# Patient Record
Sex: FEMALE | Race: NATIVE HAWAIIAN OR OTHER PACIFIC ISLANDER | NOT HISPANIC OR LATINO | Employment: FULL TIME | URBAN - METROPOLITAN AREA
[De-identification: names, ages, dates, MRNs, and addresses within clinical notes are randomized per-mention and may not be internally consistent; named-entity substitution may affect disease eponyms.]

---

## 2023-04-04 ENCOUNTER — OFFICE VISIT (OUTPATIENT)
Dept: URGENT CARE | Facility: CLINIC | Age: 53
End: 2023-04-04

## 2023-04-04 VITALS
HEART RATE: 106 BPM | SYSTOLIC BLOOD PRESSURE: 106 MMHG | OXYGEN SATURATION: 96 % | RESPIRATION RATE: 14 BRPM | DIASTOLIC BLOOD PRESSURE: 74 MMHG | WEIGHT: 163.25 LBS | TEMPERATURE: 97.8 F

## 2023-04-04 DIAGNOSIS — J02.9 SORE THROAT: Primary | ICD-10-CM

## 2023-04-04 DIAGNOSIS — J06.9 ACUTE URI: ICD-10-CM

## 2023-04-04 LAB — S PYO AG THROAT QL: NEGATIVE

## 2023-04-04 RX ORDER — NICOTINE 21 MG/24HR
1 PATCH, TRANSDERMAL 24 HOURS TRANSDERMAL DAILY
COMMUNITY
Start: 2023-03-27

## 2023-04-04 RX ORDER — ATORVASTATIN CALCIUM 40 MG/1
TABLET, FILM COATED ORAL
COMMUNITY
Start: 2023-03-17

## 2023-04-04 RX ORDER — ROPINIROLE 0.25 MG/1
TABLET, FILM COATED ORAL
COMMUNITY
Start: 2023-03-17

## 2023-04-04 NOTE — PATIENT INSTRUCTIONS
--Rest, drink plenty of fluids      --For nasal/sinus congestion, you can try steam, warm compresses, saline nasal spray, Neti pot, nasal steroid (Flonase, Nasocort) to be used at bedtime after the saline nasal spray or nasal decongestant (Afrin, Philip-synephrine - for 3 days max or you can get rebound symptoms) may be taken  Can also decongestant (such as Sudafed) if > 10years of age and no history of high blood pressure  --For cough, you can take an OTC expectorant such as plain Robitussion or Mucinex  A spoonful of honey at bedtime may also be helpful  --For sore throat, you can take OTC lozenges, use warm gargles (salt water or apple cider vinegar and honey), herbal teas, or an OTC throat spray (Chloraseptic)  --You can take Tylenol or Motrin/Advil as needed for fever, headache, body aches  Do not take Motrin/Advil if you have a history of heart disease, bleeding ulcers, or if you take blood thinners       -For cold symptoms with high blood pressure take Coricidin cough/cold  --You should contact your primary care provider and/or go to the ER if your symptoms are not improved or get worse over the next 7 days  This includes new onset fever, localized ear pain, sinus pain, as well as worsening cough, chest pain, shortness of breath, or significant weakness/fatigue

## 2023-04-04 NOTE — LETTER
April 4, 2023     Patient: Evelio Adan   YOB: 1970   Date of Visit: 4/4/2023       To Whom it May Concern:    Evelio Adan was seen in my clinic on 4/4/2023  She should be excused 4/3/23  If you have any questions or concerns, please don't hesitate to call           Sincerely,          JUDE Wray        CC: No Recipients

## 2023-04-04 NOTE — PROGRESS NOTES
[facility]        NAME: Kayla Carey is a 47 y o  female  : 1970    MRN: [id number]  DATE: 2023  TIME: 3:13 PM    Assessment and Plan   Sore throat [J02 9]  1  Sore throat  POCT rapid strepA    Throat culture      2  Acute URI          Symptoms consistent with viral illness  POC strep is negative  Given education on supportive measures for symptoms in discharge instructions  Follow up with primary care in 3-5 days  Go to ER if symptoms get worse  Patient Instructions     --Rest, drink plenty of fluids     --For nasal/sinus congestion, you can try steam, warm compresses, saline nasal spray, Neti pot, nasal steroid (Flonase, Nasocort) to be used at bedtime after the saline nasal spray or nasal decongestant (Afrin, Philip-synephrine - for 3 days max or you can get rebound symptoms) may be taken  Can also decongestant (such as Sudafed) if > 10years of age and no history of high blood pressure  --For cough, you can take an OTC expectorant such as plain Robitussion or Mucinex  A spoonful of honey at bedtime may also be helpful  --For sore throat, you can take OTC lozenges, use warm gargles (salt water or apple cider vinegar and honey), herbal teas, or an OTC throat spray (Chloraseptic)  --You can take Tylenol or Motrin/Advil as needed for fever, headache, body aches  Do not take Motrin/Advil if you have a history of heart disease, bleeding ulcers, or if you take blood thinners      -For cold symptoms with high blood pressure take Coricidin cough/cold  --You should contact your primary care provider and/or go to the ER if your symptoms are not improved or get worse over the next 7 days  This includes new onset fever, localized ear pain, sinus pain, as well as worsening cough, chest pain, shortness of breath, or significant weakness/fatigue  Chief Complaint     Chief Complaint   Patient presents with   • Neck Pain     Started   Strained neck      • Cough     Started this morning, dry cough, feeling lethargic  Sore throat  No fever  Headache  History of Present Illness       Presents with sick symptoms including neck pain that started on Sunday and now with cough, fatigued, sore throat that began this morning  Review of Systems   Review of Systems   Constitutional: Positive for fatigue  Negative for chills and fever  HENT: Positive for sore throat  Negative for congestion  Respiratory: Positive for cough  Negative for shortness of breath and wheezing  Cardiovascular: Negative for chest pain  Gastrointestinal: Negative for abdominal pain  Genitourinary: Negative for dysuria  Musculoskeletal: Positive for neck pain  Negative for myalgias  Neurological: Negative for dizziness  Psychiatric/Behavioral: Negative for confusion  Current Medications       Current Outpatient Medications:   •  atorvastatin (LIPITOR) 40 mg tablet, TAKE 1 TABLET BY MOUTH EVERY DAY AT NIGHT, Disp: , Rfl:   •  nicotine (NICODERM CQ) 21 mg/24 hr TD 24 hr patch, Place 1 patch on the skin daily, Disp: , Rfl:   •  rOPINIRole (REQUIP) 0 25 mg tablet, PLEASE SEE ATTACHED FOR DETAILED DIRECTIONS, Disp: , Rfl:     Current Allergies     Allergies as of 04/04/2023 - Reviewed 04/04/2023   Allergen Reaction Noted   • Codeine GI Intolerance 04/04/2023            The following portions of the patient's history were reviewed and updated as appropriate: allergies, current medications, past family history, past medical history, past social history, past surgical history and problem list      Past Medical History:   Diagnosis Date   • Restless leg        History reviewed  No pertinent surgical history  History reviewed  No pertinent family history  Medications have been verified  Objective   /74   Pulse (!) 106   Temp 97 8 °F (36 6 °C)   Resp 14   Wt 74 kg (163 lb 4 oz)   SpO2 96%        Physical Exam     Physical Exam  Vitals reviewed     Constitutional: General: She is not in acute distress  Appearance: Normal appearance  HENT:      Ears:      Comments: B/l TM view obstructed by dark brown cerumen     Nose: Nose normal       Mouth/Throat:      Mouth: Mucous membranes are moist       Pharynx: No posterior oropharyngeal erythema  Eyes:      Conjunctiva/sclera: Conjunctivae normal    Cardiovascular:      Rate and Rhythm: Normal rate and regular rhythm  Pulses: Normal pulses  Heart sounds: Normal heart sounds  No murmur heard  Pulmonary:      Effort: Pulmonary effort is normal  No respiratory distress  Breath sounds: Normal breath sounds  Musculoskeletal:      Cervical back: Full passive range of motion without pain  Normal range of motion  Skin:     General: Skin is warm and dry  Neurological:      General: No focal deficit present  Mental Status: She is alert and oriented to person, place, and time     Psychiatric:         Mood and Affect: Mood normal          Behavior: Behavior normal

## 2023-04-06 LAB — BACTERIA THROAT CULT: NORMAL

## 2023-05-23 PROBLEM — G43.719 INTRACTABLE CHRONIC MIGRAINE WITHOUT AURA AND WITHOUT STATUS MIGRAINOSUS: Status: ACTIVE | Noted: 2021-09-10

## 2023-05-23 PROBLEM — I70.8: Status: ACTIVE | Noted: 2021-09-10

## 2023-05-23 PROBLEM — I73.9 INTERMITTENT CLAUDICATION (HCC): Status: ACTIVE | Noted: 2021-09-10

## 2023-05-23 PROBLEM — Z72.0 TOBACCO ABUSE: Status: ACTIVE | Noted: 2022-08-20

## 2023-05-23 PROBLEM — F32.A ANXIETY AND DEPRESSION: Status: ACTIVE | Noted: 2023-04-20

## 2023-05-23 PROBLEM — I70.203 ATHEROSCLEROSIS OF ARTERY OF BOTH LOWER EXTREMITIES (HCC): Status: ACTIVE | Noted: 2021-09-10

## 2023-05-23 PROBLEM — F41.9 ANXIETY AND DEPRESSION: Status: ACTIVE | Noted: 2023-04-20

## 2023-05-23 PROBLEM — I74.5 ILIAC ARTERY OCCLUSION (HCC): Status: ACTIVE | Noted: 2022-08-20

## 2023-05-23 PROBLEM — G25.81 RESTLESS LEG SYNDROME: Status: ACTIVE | Noted: 2023-04-20

## 2024-08-27 ENCOUNTER — OFFICE VISIT (OUTPATIENT)
Dept: PHYSICAL THERAPY | Age: 54
End: 2024-08-27
Payer: COMMERCIAL

## 2024-08-27 DIAGNOSIS — M54.41 CHRONIC BILATERAL LOW BACK PAIN WITH BILATERAL SCIATICA: Primary | ICD-10-CM

## 2024-08-27 DIAGNOSIS — G89.29 CHRONIC BILATERAL LOW BACK PAIN WITH BILATERAL SCIATICA: Primary | ICD-10-CM

## 2024-08-27 DIAGNOSIS — M54.42 CHRONIC BILATERAL LOW BACK PAIN WITH BILATERAL SCIATICA: Primary | ICD-10-CM

## 2024-08-27 PROCEDURE — 97140 MANUAL THERAPY 1/> REGIONS: CPT | Performed by: PHYSICAL THERAPIST

## 2024-08-27 PROCEDURE — 97110 THERAPEUTIC EXERCISES: CPT | Performed by: PHYSICAL THERAPIST

## 2024-08-27 PROCEDURE — 97162 PT EVAL MOD COMPLEX 30 MIN: CPT | Performed by: PHYSICAL THERAPIST

## 2024-08-27 NOTE — PROGRESS NOTES
PT Evaluation     Today's date: 2024  Patient name: Simin Vail  : 1970  MRN: [id number]  Referring provider: Vinicio Ahn PT  Dx:   Encounter Diagnosis     ICD-10-CM    1. Chronic bilateral low back pain with bilateral sciatica  M54.42     M54.41     G89.29           Start Time: 1600  Stop Time: 1700  Total time in clinic (min): 60 minutes    Assessment  Impairments: abnormal gait, abnormal muscle tone, abnormal or restricted ROM, abnormal movement, activity intolerance, impaired physical strength, lacks appropriate home exercise program, pain with function, weight-bearing intolerance, poor posture  and poor body mechanics    Assessment details: Simin Vail is a 54 y.o. female who presents with pain, decreased strength, decreased ROM, decreased joint mobility, and postural dysfunction. Due to these impairments, Patient has difficulty performing a/iadls. Patient's clinical presentation is consistent with their referring diagnosis of low back pain. Patient would benefit from skilled physical therapy to address their aforementioned impairments, improve their level of function and to improve their overall quality of life.Patient was evaluated and treated today via direct access for low back pain   Understanding of Dx/Px/POC: good     Prognosis: good    Goals  ST-3 WEEKS  1.  Decrease pain by 2 points on VAS at its worst.  2.  Increase ROM by > 5 deg in all deficients planes.  3.  Increase CORE/LE by 1/2 MMT grade in all deficient planes.    LT-6 WEEKS  1. Patient to be independent with a/iadls especially with walking, standing, sitting and bending  2. Increase functional activities for leisure and home activities to previous LOF.  3. Independent with HEP and/or fitness program.    Plan  Patient would benefit from: skilled physical therapy  Planned modality interventions: cryotherapy, electrical stimulation/Russian stimulation, thermotherapy: hydrocollator packs and unattended electrical  stimulation    Planned therapy interventions: activity modification, behavior modification, body mechanics training, aquatic therapy, flexibility, functional ROM exercises, home exercise program, IADL retraining, joint mobilization, manual therapy, neuromuscular re-education, patient education, postural training, strengthening, stretching, therapeutic activities and therapeutic exercise    Frequency: 2x week (2-3x week)  Duration in weeks: 12  Plan of Care beginning date: 2024  Plan of Care expiration date: 2024  Treatment plan discussed with: patient        Subjective Evaluation    History of Present Illness  Mechanism of injury: Patient presents to PT via direct acces with complaints of low back pain , also reports symptoms on and off x 5 years and recently reports increased low back pain and left buttock pain           Not a recurrent problem   Quality of life: good    Pain  Current pain ratin  At best pain ratin  At worst pain ratin  Quality: tight and radiating  Relieving factors: medications  Aggravating factors: walking  Progression: no change    Social Support  Steps to enter house: yes  Stairs in house: yes   Lives in: multiple-level home  Lives with: spouse      Diagnostic Tests  X-ray: abnormal  MRI studies: abnormal  Treatments  Previous treatment: injection treatment, medication and physical therapy        Objective     Static Posture     Thoracic Spine  Hyperkyphosis.    Palpation   Left   Hypertonic in the erector spinae and lumbar paraspinals.     Right   Hypertonic in the erector spinae and lumbar paraspinals.     Active Range of Motion   Cervical/Thoracic Spine       Thoracic    Extension:  Restriction level: moderate    Lumbar   Flexion:  WFL  Extension:  Restriction level: minimal  Left lateral flexion: 25 degrees     Right lateral flexion: 20 degrees     Strength/Myotome Testing     Left Hip   Planes of Motion   Flexion: 4  Extension: 4  Abduction: 4  Adduction:  "4+    Right Hip   Planes of Motion   Flexion: 4  Extension: 4  Abduction: 4  Adduction: 4+    Left Knee   Flexion: 4+  Extension: 4+    Right Knee   Flexion: 4+  Extension: 4    Left Ankle/Foot   Dorsiflexion: 4+  Plantar flexion: 4+    Right Ankle/Foot   Dorsiflexion: 4+  Plantar flexion: 4+    Additional Strength Details  CORE is 3/5    Tests     Lumbar     Left   Negative passive SLR and slump test.     Right   Negative passive SLR and slump test.     Ambulation     Observational Gait   Gait: antalgic   Decreased walking speed and stride length.   Left arm swing: decreased  Right arm swing: decreased    Functional Assessment        Single Leg Stance   Left: 9 seconds  Right: 8 seconds      Flowsheet Rows      Flowsheet Row Most Recent Value   PT/OT G-Codes    Current Score 47   Projected Score 56               Precautions: standard  POC expires Unit limit Auth Expiration date PT/OT + Visit Limit?   11/27/2024   35                           Visit/Unit Tracking  AUTH Status:  Date 8/27               Used 1               Remaining                 FOTO                      Manuals 8/27                         MT LB/LE 10 min            PA glide T12                          Neuro Re-Ed                                                                                                        Ther Ex             Nu step 5 min            slant 30\"/4x            Hip abd 30/30            Hip add 30/30            Core Heel slides 30x            Open book 10x ea                                      Ther Activity                                       Gait Training                                       Modalities                                            "

## 2024-08-27 NOTE — LETTER
2024      No Recipients    Patient: Simin Vail   YOB: 1970   Date of Visit: 2024     Encounter Diagnosis     ICD-10-CM    1. Chronic bilateral low back pain with bilateral sciatica  M54.42     M54.41     G89.29           Dear Dr. Nichole Recipients:    Thank you for your recent referral of Simin Vail. Please review the attached evaluation summary from Simin's recent visit.     Please verify that you agree with the plan of care by signing the attached order.     If you have any questions or concerns, please do not hesitate to call.     I sincerely appreciate the opportunity to share in the care of one of your patients and hope to have another opportunity to work with you in the near future.       Sincerely,    Vinicio Ahn, PT      Referring Provider:      I certify that I have read the below Plan of Care and certify the need for these services furnished under this plan of treatment while under my care.                      No Recipients          PT Evaluation     Today's date: 2024  Patient name: Simin Vail  : 1970  MRN: [id number]  Referring provider: Vinicio Ahn, PT  Dx:   Encounter Diagnosis     ICD-10-CM    1. Chronic bilateral low back pain with bilateral sciatica  M54.42     M54.41     G89.29           Start Time: 1600  Stop Time: 1700  Total time in clinic (min): 60 minutes    Assessment  Impairments: abnormal gait, abnormal muscle tone, abnormal or restricted ROM, abnormal movement, activity intolerance, impaired physical strength, lacks appropriate home exercise program, pain with function, weight-bearing intolerance, poor posture  and poor body mechanics    Assessment details: Simin Vail is a 54 y.o. female who presents with pain, decreased strength, decreased ROM, decreased joint mobility, and postural dysfunction. Due to these impairments, Patient has difficulty performing a/iadls. Patient's clinical presentation is consistent with their  referring diagnosis of low back pain. Patient would benefit from skilled physical therapy to address their aforementioned impairments, improve their level of function and to improve their overall quality of life.Patient was evaluated and treated today via direct access for low back pain   Understanding of Dx/Px/POC: good     Prognosis: good    Goals  ST-3 WEEKS  1.  Decrease pain by 2 points on VAS at its worst.  2.  Increase ROM by > 5 deg in all deficients planes.  3.  Increase CORE/LE by 1/2 MMT grade in all deficient planes.    LT-6 WEEKS  1. Patient to be independent with a/iadls especially with walking, standing, sitting and bending  2. Increase functional activities for leisure and home activities to previous LOF.  3. Independent with HEP and/or fitness program.    Plan  Patient would benefit from: skilled physical therapy  Planned modality interventions: cryotherapy, electrical stimulation/Russian stimulation, thermotherapy: hydrocollator packs and unattended electrical stimulation    Planned therapy interventions: activity modification, behavior modification, body mechanics training, aquatic therapy, flexibility, functional ROM exercises, home exercise program, IADL retraining, joint mobilization, manual therapy, neuromuscular re-education, patient education, postural training, strengthening, stretching, therapeutic activities and therapeutic exercise    Frequency: 2x week (2-3x week)  Duration in weeks: 12  Plan of Care beginning date: 2024  Plan of Care expiration date: 2024  Treatment plan discussed with: patient        Subjective Evaluation    History of Present Illness  Mechanism of injury: Patient presents to PT via direct acces with complaints of low back pain , also reports symptoms on and off x 5 years and recently reports increased low back pain and left buttock pain           Not a recurrent problem   Quality of life: good    Pain  Current pain ratin  At best pain rating:  5  At worst pain ratin  Quality: tight and radiating  Relieving factors: medications  Aggravating factors: walking  Progression: no change    Social Support  Steps to enter house: yes  Stairs in house: yes   Lives in: multiple-level home  Lives with: spouse      Diagnostic Tests  X-ray: abnormal  MRI studies: abnormal  Treatments  Previous treatment: injection treatment, medication and physical therapy        Objective     Static Posture     Thoracic Spine  Hyperkyphosis.    Palpation   Left   Hypertonic in the erector spinae and lumbar paraspinals.     Right   Hypertonic in the erector spinae and lumbar paraspinals.     Active Range of Motion   Cervical/Thoracic Spine       Thoracic    Extension:  Restriction level: moderate    Lumbar   Flexion:  WFL  Extension:  Restriction level: minimal  Left lateral flexion: 25 degrees     Right lateral flexion: 20 degrees     Strength/Myotome Testing     Left Hip   Planes of Motion   Flexion: 4  Extension: 4  Abduction: 4  Adduction: 4+    Right Hip   Planes of Motion   Flexion: 4  Extension: 4  Abduction: 4  Adduction: 4+    Left Knee   Flexion: 4+  Extension: 4+    Right Knee   Flexion: 4+  Extension: 4    Left Ankle/Foot   Dorsiflexion: 4+  Plantar flexion: 4+    Right Ankle/Foot   Dorsiflexion: 4+  Plantar flexion: 4+    Additional Strength Details  CORE is 3/5    Tests     Lumbar     Left   Negative passive SLR and slump test.     Right   Negative passive SLR and slump test.     Ambulation     Observational Gait   Gait: antalgic   Decreased walking speed and stride length.   Left arm swing: decreased  Right arm swing: decreased    Functional Assessment        Single Leg Stance   Left: 9 seconds  Right: 8 seconds      Flowsheet Rows      Flowsheet Row Most Recent Value   PT/OT G-Codes    Current Score 47   Projected Score 56               Precautions: standard  POC expires Unit limit Auth Expiration date PT/OT + Visit Limit?   2024   35                            Visit/Unit Tracking  AUTH Status:  Date 8/27               Used 1               Remaining                 FOTO                      Manuals 8/27                         MT LB/LE 10 min            PA glide T12                          Neuro Re-Ed                                                                                                        Ther Ex             Nu step             slant             Hip abd             Hip add             Core Heel slides                                                    Ther Activity                                       Gait Training                                       Modalities

## 2024-08-28 ENCOUNTER — OFFICE VISIT (OUTPATIENT)
Dept: PHYSICAL THERAPY | Age: 54
End: 2024-08-28
Payer: COMMERCIAL

## 2024-08-28 DIAGNOSIS — G89.29 CHRONIC BILATERAL LOW BACK PAIN WITH BILATERAL SCIATICA: Primary | ICD-10-CM

## 2024-08-28 DIAGNOSIS — M54.41 CHRONIC BILATERAL LOW BACK PAIN WITH BILATERAL SCIATICA: Primary | ICD-10-CM

## 2024-08-28 DIAGNOSIS — M54.42 CHRONIC BILATERAL LOW BACK PAIN WITH BILATERAL SCIATICA: Primary | ICD-10-CM

## 2024-08-28 PROCEDURE — 97140 MANUAL THERAPY 1/> REGIONS: CPT | Performed by: PHYSICAL THERAPIST

## 2024-08-28 PROCEDURE — 97110 THERAPEUTIC EXERCISES: CPT | Performed by: PHYSICAL THERAPIST

## 2024-08-30 ENCOUNTER — APPOINTMENT (OUTPATIENT)
Dept: PHYSICAL THERAPY | Age: 54
End: 2024-08-30
Payer: COMMERCIAL

## 2024-11-29 ENCOUNTER — OFFICE VISIT (OUTPATIENT)
Dept: URGENT CARE | Facility: CLINIC | Age: 54
End: 2024-11-29
Payer: COMMERCIAL

## 2024-11-29 ENCOUNTER — APPOINTMENT (OUTPATIENT)
Dept: RADIOLOGY | Facility: CLINIC | Age: 54
End: 2024-11-29
Payer: COMMERCIAL

## 2024-11-29 VITALS
HEART RATE: 134 BPM | DIASTOLIC BLOOD PRESSURE: 68 MMHG | SYSTOLIC BLOOD PRESSURE: 120 MMHG | RESPIRATION RATE: 18 BRPM | OXYGEN SATURATION: 95 % | TEMPERATURE: 98.1 F

## 2024-11-29 DIAGNOSIS — R06.02 SHORTNESS OF BREATH: ICD-10-CM

## 2024-11-29 DIAGNOSIS — J20.9 ACUTE BRONCHITIS, UNSPECIFIED ORGANISM: ICD-10-CM

## 2024-11-29 DIAGNOSIS — J18.9 PNEUMONIA OF RIGHT LOWER LOBE DUE TO INFECTIOUS ORGANISM: Primary | ICD-10-CM

## 2024-11-29 PROBLEM — M25.559 ARTHRALGIA OF HIP: Status: ACTIVE | Noted: 2021-09-10

## 2024-11-29 PROBLEM — M19.011 ARTHRITIS OF BOTH ACROMIOCLAVICULAR JOINTS: Status: ACTIVE | Noted: 2023-11-07

## 2024-11-29 PROBLEM — R53.83 OTHER FATIGUE: Status: ACTIVE | Noted: 2021-09-10

## 2024-11-29 PROBLEM — R76.8 POSITIVE ANA (ANTINUCLEAR ANTIBODY): Status: ACTIVE | Noted: 2024-02-07

## 2024-11-29 PROBLEM — E78.2 MIXED HYPERLIPIDEMIA: Status: ACTIVE | Noted: 2021-09-10

## 2024-11-29 PROBLEM — M54.16 LUMBAR RADICULOPATHY: Status: ACTIVE | Noted: 2021-12-18

## 2024-11-29 PROBLEM — M19.012 ARTHRITIS OF BOTH ACROMIOCLAVICULAR JOINTS: Status: ACTIVE | Noted: 2023-11-07

## 2024-11-29 PROCEDURE — 71046 X-RAY EXAM CHEST 2 VIEWS: CPT

## 2024-11-29 PROCEDURE — 94640 AIRWAY INHALATION TREATMENT: CPT

## 2024-11-29 PROCEDURE — S9083 URGENT CARE CENTER GLOBAL: HCPCS

## 2024-11-29 PROCEDURE — G0382 LEV 3 HOSP TYPE B ED VISIT: HCPCS

## 2024-11-29 RX ORDER — AZITHROMYCIN 250 MG/1
TABLET, FILM COATED ORAL
Qty: 6 TABLET | Refills: 0 | Status: SHIPPED | OUTPATIENT
Start: 2024-11-29 | End: 2024-12-03

## 2024-11-29 RX ORDER — PREDNISONE 10 MG/1
TABLET ORAL
Qty: 27 TABLET | Refills: 0 | Status: SHIPPED | OUTPATIENT
Start: 2024-11-29

## 2024-11-29 RX ORDER — IPRATROPIUM BROMIDE AND ALBUTEROL SULFATE 2.5; .5 MG/3ML; MG/3ML
3 SOLUTION RESPIRATORY (INHALATION) ONCE
Status: COMPLETED | OUTPATIENT
Start: 2024-11-29 | End: 2024-11-29

## 2024-11-29 RX ORDER — ALBUTEROL SULFATE 90 UG/1
2 INHALANT RESPIRATORY (INHALATION) EVERY 6 HOURS PRN
Qty: 8.5 G | Refills: 0 | Status: SHIPPED | OUTPATIENT
Start: 2024-11-29

## 2024-11-29 RX ADMIN — IPRATROPIUM BROMIDE AND ALBUTEROL SULFATE 3 ML: 2.5; .5 SOLUTION RESPIRATORY (INHALATION) at 10:48

## 2024-11-29 NOTE — PROGRESS NOTES
[facility]    NAME: Simin Vail is a 54 y.o. female  : 1970    MRN: [id number]  DATE: 2024  TIME: 11:39 AM    Assessment and Plan   Pneumonia of right lower lobe due to infectious organism [J18.9]  1. Pneumonia of right lower lobe due to infectious organism  amoxicillin-clavulanate (AUGMENTIN) 875-125 mg per tablet    azithromycin (ZITHROMAX) 250 mg tablet      2. Shortness of breath  ipratropium-albuterol (DUO-NEB) 0.5-2.5 mg/3 mL inhalation solution 3 mL    XR chest pa and lateral      3. Acute bronchitis, unspecified organism  ipratropium-albuterol (DUO-NEB) 0.5-2.5 mg/3 mL inhalation solution 3 mL    predniSONE 10 mg tablet    albuterol (ProAir HFA) 90 mcg/act inhaler        Patient visually and respiratory depressant arrival.  Giving wheezing lung sounds immediately giving DuoNeb treatment.  With that decreasing shortness of breath and signs of respiratory distress respiratory rate did go down to 20.  At all times she was able to say full sentences without taking pauses for breaths.  Xr with pneumonia possible with opacities. Placed on antibiotics.   Follow up with primary care in 3-5 days.  Go to ER if symptoms get worse.     Patient Instructions     Take all of the steroid and antibiotics.   Go see your regular family doctor in 3-5 days.  Go to emergency room (ER) if you are getting worse.     Chief Complaint     Chief Complaint   Patient presents with    Cough     Difficulty breathing, coughing, body aches 3 weeks. SOB, sinus pressure, clammy. Using inhaler.          History of Present Illness       Presents with shortness of breath symptoms that began within the last few days.  Reports that she denies history of asthma or COPD issues.  She has been giving an inhaler in the past.  She does have chronic allergy symptoms that are worse since moving up here from [place].  She reports that she does feel winded even from walking down the stairs and has to sit down.  She reports  that she does get chills and sweating to the face with moving.  She is visibly short of breath on arrival.        Review of Systems   Review of Systems   Constitutional:  Negative for chills and fever.   HENT:  Negative for congestion and sore throat.    Respiratory:  Positive for cough, shortness of breath and wheezing.    Cardiovascular:  Negative for chest pain.   Gastrointestinal:  Negative for abdominal pain.   Musculoskeletal:  Negative for myalgias.   Psychiatric/Behavioral:  Negative for confusion.          Current Medications       Current Outpatient Medications:     albuterol (ProAir HFA) 90 mcg/act inhaler, Inhale 2 puffs every 6 (six) hours as needed for wheezing, Disp: 8.5 g, Rfl: 0    amoxicillin-clavulanate (AUGMENTIN) 875-125 mg per tablet, Take 1 tablet by mouth every 12 (twelve) hours for 7 days, Disp: 14 tablet, Rfl: 0    atorvastatin (LIPITOR) 40 mg tablet, TAKE 1 TABLET BY MOUTH EVERY DAY AT NIGHT, Disp: , Rfl:     azithromycin (ZITHROMAX) 250 mg tablet, Take 2 tablets today then 1 tablet daily x 4 days, Disp: 6 tablet, Rfl: 0    gabapentin (NEURONTIN) 300 mg capsule, Take 300 mg by mouth, Disp: , Rfl:     nicotine (NICODERM CQ) 21 mg/24 hr TD 24 hr patch, Place 1 patch on the skin daily, Disp: , Rfl:     predniSONE 10 mg tablet, Take 40 mg (4 tablets) for 3 days, take 30 mg (3 tablets) for 3 days, take 20 mg (2 tablets) for 3 days., Disp: 27 tablet, Rfl: 0    rOPINIRole (REQUIP) 0.25 mg tablet, PLEASE SEE ATTACHED FOR DETAILED DIRECTIONS, Disp: , Rfl:     naproxen (NAPROSYN) 500 mg tablet, Take 500 mg by mouth, Disp: , Rfl:   No current facility-administered medications for this visit.    Current Allergies     Allergies as of 11/29/2024 - Reviewed 11/29/2024   Allergen Reaction Noted    Codeine GI Intolerance 04/04/2023            The following portions of the patient's history were reviewed and updated as appropriate: allergies, current medications, past family history, past medical history,  past social history, past surgical history and problem list.     Past Medical History:   Diagnosis Date    Restless leg        History reviewed. No pertinent surgical history.    History reviewed. No pertinent family history.      Medications have been verified.        Objective   /68   Pulse (!) 134   Temp 98.1 °F (36.7 °C)   Resp 18   SpO2 95%        Physical Exam     Physical Exam  Vitals reviewed.   Constitutional:       General: She is not in acute distress.     Appearance: Normal appearance.   HENT:      Right Ear: Tympanic membrane, ear canal and external ear normal.      Left Ear: Tympanic membrane, ear canal and external ear normal.      Nose: Nose normal.      Mouth/Throat:      Mouth: Mucous membranes are moist.      Pharynx: No posterior oropharyngeal erythema.   Eyes:      Conjunctiva/sclera: Conjunctivae normal.   Cardiovascular:      Rate and Rhythm: Normal rate and regular rhythm.      Pulses: Normal pulses.      Heart sounds: Normal heart sounds. No murmur heard.  Pulmonary:      Effort: Pulmonary effort is normal. No respiratory distress.      Breath sounds: Wheezing present. No rales.   Abdominal:      General: Bowel sounds are normal.      Palpations: Abdomen is soft.   Skin:     General: Skin is warm and dry.   Neurological:      General: No focal deficit present.      Mental Status: She is alert and oriented to person, place, and time.   Psychiatric:         Mood and Affect: Mood normal.         Behavior: Behavior normal.         Mini neb    Performed by: JUDE Rousseau  Authorized by: JUDE Rousseau  Universal Protocol:  Consent given by: patient  Timeout called at: 11/29/2024 11:39 AM.  Patient understanding: patient states understanding of the procedure being performed  Patient identity confirmed: verbally with patient    Number of treatments:  1  Treatment 1:   Pre-Procedure     Symptoms:  Wheezing, cough and shortness of breath    Lung Sounds:  Wheezing  all fields    HR:  135    RR:  28    SP02:  95    Medication Administered:  Duoneb - Albuterol 2.5 mg/Atrovent 0.5 mg  Post-Procedure     Lung sounds:  Clear    HR:  134    RR:  20    SP02:  95

## 2024-12-17 ENCOUNTER — APPOINTMENT (EMERGENCY)
Dept: CT IMAGING | Facility: HOSPITAL | Age: 54
End: 2024-12-17
Payer: COMMERCIAL

## 2024-12-17 ENCOUNTER — HOSPITAL ENCOUNTER (EMERGENCY)
Facility: HOSPITAL | Age: 54
Discharge: HOME | End: 2024-12-17
Attending: INTERNAL MEDICINE
Payer: COMMERCIAL

## 2024-12-17 VITALS
DIASTOLIC BLOOD PRESSURE: 67 MMHG | HEART RATE: 103 BPM | OXYGEN SATURATION: 93 % | TEMPERATURE: 98.2 F | RESPIRATION RATE: 18 BRPM | SYSTOLIC BLOOD PRESSURE: 155 MMHG

## 2024-12-17 DIAGNOSIS — F32.A ANXIETY AND DEPRESSION: ICD-10-CM

## 2024-12-17 DIAGNOSIS — F41.9 ANXIETY AND DEPRESSION: ICD-10-CM

## 2024-12-17 DIAGNOSIS — J44.1 COPD WITH EXACERBATION (HCC): Primary | ICD-10-CM

## 2024-12-17 LAB
ALBUMIN SERPL BCG-MCNC: 3.9 G/DL (ref 3.5–5)
ALP SERPL-CCNC: 49 U/L (ref 34–104)
ALT SERPL W P-5'-P-CCNC: 16 U/L (ref 7–52)
ANION GAP SERPL CALCULATED.3IONS-SCNC: 7 MMOL/L (ref 4–13)
AST SERPL W P-5'-P-CCNC: 12 U/L (ref 13–39)
BASOPHILS # BLD AUTO: 0.04 THOUSANDS/ΜL (ref 0–0.1)
BASOPHILS NFR BLD AUTO: 0 % (ref 0–1)
BILIRUB SERPL-MCNC: 0.38 MG/DL (ref 0.2–1)
BILIRUB UR QL STRIP: NEGATIVE
BUN SERPL-MCNC: 9 MG/DL (ref 5–25)
CALCIUM SERPL-MCNC: 8.9 MG/DL (ref 8.4–10.2)
CHLORIDE SERPL-SCNC: 107 MMOL/L (ref 96–108)
CLARITY UR: CLEAR
CO2 SERPL-SCNC: 28 MMOL/L (ref 21–32)
COLOR UR: YELLOW
CREAT SERPL-MCNC: 0.62 MG/DL (ref 0.6–1.3)
EOSINOPHIL # BLD AUTO: 0.23 THOUSAND/ΜL (ref 0–0.61)
EOSINOPHIL NFR BLD AUTO: 3 % (ref 0–6)
ERYTHROCYTE [DISTWIDTH] IN BLOOD BY AUTOMATED COUNT: 12.3 % (ref 11.6–15.1)
FLUAV AG UPPER RESP QL IA.RAPID: NEGATIVE
FLUBV AG UPPER RESP QL IA.RAPID: NEGATIVE
GFR SERPL CREATININE-BSD FRML MDRD: 103 ML/MIN/1.73SQ M
GLUCOSE SERPL-MCNC: 111 MG/DL (ref 65–140)
GLUCOSE UR STRIP-MCNC: NEGATIVE MG/DL
HCT VFR BLD AUTO: 38.5 % (ref 34.8–46.1)
HGB BLD-MCNC: 12.8 G/DL (ref 11.5–15.4)
HGB UR QL STRIP.AUTO: NEGATIVE
IMM GRANULOCYTES # BLD AUTO: 0.02 THOUSAND/UL (ref 0–0.2)
IMM GRANULOCYTES NFR BLD AUTO: 0 % (ref 0–2)
KETONES UR STRIP-MCNC: NEGATIVE MG/DL
LEUKOCYTE ESTERASE UR QL STRIP: NEGATIVE
LYMPHOCYTES # BLD AUTO: 3.05 THOUSANDS/ΜL (ref 0.6–4.47)
LYMPHOCYTES NFR BLD AUTO: 33 % (ref 14–44)
MCH RBC QN AUTO: 32.4 PG (ref 26.8–34.3)
MCHC RBC AUTO-ENTMCNC: 33.2 G/DL (ref 31.4–37.4)
MCV RBC AUTO: 98 FL (ref 82–98)
MONOCYTES # BLD AUTO: 0.95 THOUSAND/ΜL (ref 0.17–1.22)
MONOCYTES NFR BLD AUTO: 10 % (ref 4–12)
NEUTROPHILS # BLD AUTO: 5.04 THOUSANDS/ΜL (ref 1.85–7.62)
NEUTS SEG NFR BLD AUTO: 54 % (ref 43–75)
NITRITE UR QL STRIP: NEGATIVE
NRBC BLD AUTO-RTO: 0 /100 WBCS
PH UR STRIP.AUTO: 8 [PH]
PLATELET # BLD AUTO: 277 THOUSANDS/UL (ref 149–390)
PMV BLD AUTO: 11.8 FL (ref 8.9–12.7)
POTASSIUM SERPL-SCNC: 3.8 MMOL/L (ref 3.5–5.3)
PROT SERPL-MCNC: 6.8 G/DL (ref 6.4–8.4)
PROT UR STRIP-MCNC: NEGATIVE MG/DL
RBC # BLD AUTO: 3.95 MILLION/UL (ref 3.81–5.12)
SARS-COV+SARS-COV-2 AG RESP QL IA.RAPID: NEGATIVE
SODIUM SERPL-SCNC: 142 MMOL/L (ref 135–147)
SP GR UR STRIP.AUTO: 1.01
UROBILINOGEN UR QL STRIP.AUTO: 0.2 E.U./DL
WBC # BLD AUTO: 9.33 THOUSAND/UL (ref 4.31–10.16)

## 2024-12-17 PROCEDURE — 93005 ELECTROCARDIOGRAM TRACING: CPT

## 2024-12-17 PROCEDURE — 85025 COMPLETE CBC W/AUTO DIFF WBC: CPT | Performed by: INTERNAL MEDICINE

## 2024-12-17 PROCEDURE — 87804 INFLUENZA ASSAY W/OPTIC: CPT | Performed by: INTERNAL MEDICINE

## 2024-12-17 PROCEDURE — 36415 COLL VENOUS BLD VENIPUNCTURE: CPT | Performed by: INTERNAL MEDICINE

## 2024-12-17 PROCEDURE — 96374 THER/PROPH/DIAG INJ IV PUSH: CPT

## 2024-12-17 PROCEDURE — 87811 SARS-COV-2 COVID19 W/OPTIC: CPT | Performed by: INTERNAL MEDICINE

## 2024-12-17 PROCEDURE — 94640 AIRWAY INHALATION TREATMENT: CPT

## 2024-12-17 PROCEDURE — 99285 EMERGENCY DEPT VISIT HI MDM: CPT | Performed by: INTERNAL MEDICINE

## 2024-12-17 PROCEDURE — 74177 CT ABD & PELVIS W/CONTRAST: CPT

## 2024-12-17 PROCEDURE — 99285 EMERGENCY DEPT VISIT HI MDM: CPT

## 2024-12-17 PROCEDURE — 71260 CT THORAX DX C+: CPT

## 2024-12-17 PROCEDURE — 96375 TX/PRO/DX INJ NEW DRUG ADDON: CPT

## 2024-12-17 PROCEDURE — 80053 COMPREHEN METABOLIC PANEL: CPT | Performed by: INTERNAL MEDICINE

## 2024-12-17 PROCEDURE — 96361 HYDRATE IV INFUSION ADD-ON: CPT

## 2024-12-17 PROCEDURE — 81003 URINALYSIS AUTO W/O SCOPE: CPT | Performed by: INTERNAL MEDICINE

## 2024-12-17 RX ORDER — LORAZEPAM 0.5 MG/1
0.5 TABLET ORAL
Qty: 1 TABLET | Refills: 0 | Status: SHIPPED | OUTPATIENT
Start: 2024-12-17 | End: 2024-12-27

## 2024-12-17 RX ORDER — LORAZEPAM 1 MG/1
0.5 TABLET ORAL ONCE
Status: COMPLETED | OUTPATIENT
Start: 2024-12-17 | End: 2024-12-17

## 2024-12-17 RX ORDER — DEXTROMETHORPHAN HYDROBROMIDE AND PROMETHAZINE HYDROCHLORIDE 15; 6.25 MG/5ML; MG/5ML
5 SYRUP ORAL 4 TIMES DAILY PRN
Qty: 118 ML | Refills: 0 | Status: SHIPPED | OUTPATIENT
Start: 2024-12-17

## 2024-12-17 RX ORDER — PREDNISONE 10 MG/1
TABLET ORAL
Qty: 20 TABLET | Refills: 0 | Status: SHIPPED | OUTPATIENT
Start: 2024-12-17

## 2024-12-17 RX ORDER — METHYLPREDNISOLONE SODIUM SUCCINATE 125 MG/2ML
80 INJECTION, POWDER, LYOPHILIZED, FOR SOLUTION INTRAMUSCULAR; INTRAVENOUS ONCE
Status: COMPLETED | OUTPATIENT
Start: 2024-12-17 | End: 2024-12-17

## 2024-12-17 RX ORDER — ALBUTEROL SULFATE 0.83 MG/ML
2.5 SOLUTION RESPIRATORY (INHALATION) EVERY 6 HOURS PRN
Qty: 75 ML | Refills: 0 | Status: SHIPPED | OUTPATIENT
Start: 2024-12-17

## 2024-12-17 RX ORDER — FLUTICASONE FUROATE, UMECLIDINIUM BROMIDE AND VILANTEROL TRIFENATATE 100; 62.5; 25 UG/1; UG/1; UG/1
1 POWDER RESPIRATORY (INHALATION) DAILY
Qty: 60 BLISTER | Refills: 0 | Status: SHIPPED | OUTPATIENT
Start: 2024-12-17 | End: 2025-01-16

## 2024-12-17 RX ORDER — KETOROLAC TROMETHAMINE 30 MG/ML
30 INJECTION, SOLUTION INTRAMUSCULAR; INTRAVENOUS ONCE
Status: COMPLETED | OUTPATIENT
Start: 2024-12-17 | End: 2024-12-17

## 2024-12-17 RX ORDER — ALBUTEROL SULFATE 0.83 MG/ML
2.5 SOLUTION RESPIRATORY (INHALATION) ONCE
Status: COMPLETED | OUTPATIENT
Start: 2024-12-17 | End: 2024-12-17

## 2024-12-17 RX ADMIN — ALBUTEROL SULFATE 2.5 MG: 2.5 SOLUTION RESPIRATORY (INHALATION) at 19:09

## 2024-12-17 RX ADMIN — SODIUM CHLORIDE 1000 ML: 0.9 INJECTION, SOLUTION INTRAVENOUS at 18:58

## 2024-12-17 RX ADMIN — METHYLPREDNISOLONE SODIUM SUCCINATE 80 MG: 125 INJECTION, POWDER, FOR SOLUTION INTRAMUSCULAR; INTRAVENOUS at 21:27

## 2024-12-17 RX ADMIN — KETOROLAC TROMETHAMINE 30 MG: 30 INJECTION, SOLUTION INTRAMUSCULAR at 19:08

## 2024-12-17 RX ADMIN — IOHEXOL 100 ML: 350 INJECTION, SOLUTION INTRAVENOUS at 19:39

## 2024-12-17 RX ADMIN — ALBUTEROL SULFATE 2.5 MG: 2.5 SOLUTION RESPIRATORY (INHALATION) at 20:55

## 2024-12-17 RX ADMIN — LORAZEPAM 0.5 MG: 1 TABLET ORAL at 21:25

## 2024-12-17 NOTE — ED PROVIDER NOTES
Time reflects when diagnosis was documented in both MDM as applicable and the Disposition within this note       Time User Action Codes Description Comment    12/17/2024  9:25 PM Al Mcbride Add [J44.1] COPD with exacerbation (HCC)     12/17/2024  9:32 PM Al Mcbride Add [F41.9,  F32.A] Anxiety and depression           ED Disposition       ED Disposition   Discharge    Condition   Stable    Date/Time   Tue Dec 17, 2024  9:20 PM    Comment   Simin Vail discharge to home/self care.                   Assessment & Plan       Medical Decision Making  54-year-old female was diagnosed with pneumonia over Thanksgiving, roughly 3 weeks ago.  Cough is not dissipated.  Now having left rib pain, pain in her abdomen.  She does describe having significant constipation in the past.  She is not having fever.  Pain radiates to her back, mid epigastric area, and left lower quadrant area.    Past history significant for peripheral vascular disease, undergoing treatment from a Dr. Liu at [facility].  She is a smoker, but has not smoked in last 3 days.  Has some chronic pain issues.    Patient's exam today revealing left lateral chest wall pain, inspiratory pain, and also left lower quadrant pain which may suggest diverticulitis.  She does admit to having difficulty moving her bowels of late.  She feels quite bloated, but the pain does not feel like constipation to her.  Will proceed with blood work, CAT scan imaging of both chest and abdomen given the degree of discomfort she is in.  Will treat initially with Toradol.    Amount and/or Complexity of Data Reviewed  Labs: ordered.     Details: Laboratory data reveals normal WBC, and normal electrolyte pattern.  Flu was also negative.  Radiology: ordered.     Details: CT chest abdomen pelvis failed reveal any significant abnormalities.  Some platelike atelectasis in the left lingular and lower lobe right which she is having her  discomfort.    Risk  Prescription drug management.  Risk Details: Treat patient for COPD exacerbation or persistent COPD and bronchospasm.  Prednisone, taper given following a Solu-Medrol dose.  Also started on Trelegy as well as given her a incentive spirometer as well as a nebulizer with appropriate solutions called in for her.  She should follow-up with her primary care doctor, return here should her symptoms worsen.  Pupils wounds feeling much better after 2 nebulizers here.  Coughing much less.        ED Course as of 12/17/24 2247   Tue Dec 17, 2024   2034 Patient feeling somewhat better after Toradol.  Awaiting urine and imaging reports.   2118 Patient is feeling better after neb and Toradol.  CT fails reveal any significant pneumonias or intra-abdominal pathology.  Likely has costochondral pain associated constipation issues.  Will treat with a nebulizer treatment from home.  Have also ordered Trelegy.  Course of steroids.  No active sign of infection noted       Medications   sodium chloride 0.9 % bolus 1,000 mL (0 mL Intravenous Stopped 12/17/24 1958)   ketorolac (TORADOL) injection 30 mg (30 mg Intravenous Given 12/17/24 1908)   albuterol inhalation solution 2.5 mg (2.5 mg Nebulization Given 12/17/24 1909)   iohexol (OMNIPAQUE) 350 MG/ML injection (MULTI-DOSE) 100 mL (100 mL Intravenous Given 12/17/24 1939)   albuterol inhalation solution 2.5 mg (2.5 mg Nebulization Given 12/17/24 2055)   LORazepam (ATIVAN) tablet 0.5 mg (0.5 mg Oral Given 12/17/24 2125)   methylPREDNISolone sodium succinate (Solu-MEDROL) injection 80 mg (80 mg Intravenous Given 12/17/24 2127)       ED Risk Strat Scores                          SBIRT 22yo+      Flowsheet Row Most Recent Value   Initial Alcohol Screen: US AUDIT-C     1. How often do you have a drink containing alcohol? 0 Filed at: 12/17/2024 1834   2. How many drinks containing alcohol do you have on a typical day you are drinking?  0 Filed at: 12/17/2024 1834   3a. Male  UNDER 65: How often do you have five or more drinks on one occasion? 0 Filed at: 12/17/2024 1834   3b. FEMALE Any Age, or MALE 65+: How often do you have 4 or more drinks on one occassion? 0 Filed at: 12/17/2024 1834   Audit-C Score 0 Filed at: 12/17/2024 1834   CARINA: How many times in the past year have you...    Used an illegal drug or used a prescription medication for non-medical reasons? Never Filed at: 12/17/2024 1834                            History of Present Illness       Chief Complaint   Patient presents with    Cough     Cough, chest pain, back pain, and constipation worsening over the past few days. Patient reports recent pneumonia; finished abx about a week ago.        Past Medical History:   Diagnosis Date    Restless leg       History reviewed. No pertinent surgical history.   History reviewed. No pertinent family history.   Social History     Tobacco Use    Smoking status: Every Day     Types: Cigarettes    Smokeless tobacco: Never    Tobacco comments:     Smokes cloves   Substance Use Topics    Alcohol use: Never    Drug use: Never      E-Cigarette/Vaping      E-Cigarette/Vaping Substances      I have reviewed and agree with the history as documented.     54-year-old female was diagnosed with pneumonia over Thanksgiving, roughly 3 weeks ago.  Cough is not dissipated.  Now having left rib pain, pain in her abdomen.  She does describe having significant constipation in the past.  She is not having fever.  Pain radiates to her back, mid epigastric area, and left lower quadrant area.        Review of Systems   Constitutional:  Negative for chills and fever.   HENT:  Negative for ear pain, rhinorrhea and sore throat.    Eyes:  Negative for pain, redness and visual disturbance.   Respiratory:  Positive for shortness of breath and wheezing. Negative for cough.    Cardiovascular:  Positive for chest pain. Negative for leg swelling.   Gastrointestinal:  Positive for abdominal pain. Negative for diarrhea,  nausea and vomiting.   Genitourinary:  Positive for flank pain. Negative for dysuria, frequency and urgency.   Musculoskeletal:  Positive for arthralgias. Negative for back pain, myalgias and neck pain.   Skin:  Negative for rash.   Neurological:  Negative for dizziness, weakness, light-headedness and headaches.   Hematological: Negative.    Psychiatric/Behavioral:  Negative for agitation, confusion and suicidal ideas. The patient is not nervous/anxious.    All other systems reviewed and are negative.          Objective       ED Triage Vitals [12/17/24 1832]   Temperature Pulse Blood Pressure Respirations SpO2 Patient Position - Orthostatic VS   98.2 °F (36.8 °C) 102 145/87 20 95 % Sitting      Temp Source Heart Rate Source BP Location FiO2 (%) Pain Score    Temporal Monitor Left arm -- 10 - Worst Possible Pain      Vitals      Date and Time Temp Pulse SpO2 Resp BP Pain Score FACES Pain Rating User   12/17/24 2130 -- 103 93 % 18 155/67 -- --    12/17/24 2115 -- 102 93 % 18 130/60 -- --    12/17/24 1908 -- -- -- -- -- 10 - Worst Possible Pain --    12/17/24 1832 98.2 °F (36.8 °C) 102 95 % 20 145/87 10 - Worst Possible Pain -- SG            Physical Exam  Vitals and nursing note reviewed.   Constitutional:       General: She is not in acute distress.     Appearance: Normal appearance. She is well-developed.   HENT:      Head: Normocephalic and atraumatic.   Eyes:      Conjunctiva/sclera: Conjunctivae normal.   Cardiovascular:      Rate and Rhythm: Normal rate and regular rhythm.      Pulses: Normal pulses.      Heart sounds: Normal heart sounds. No murmur heard.  Pulmonary:      Effort: Pulmonary effort is normal. No respiratory distress.      Breath sounds: Normal breath sounds.      Comments: Decreased breath sounds, very reproducible chest wall tenderness.  Chest:      Chest wall: Tenderness present.   Abdominal:      Palpations: Abdomen is soft.      Tenderness: There is no abdominal tenderness.    Musculoskeletal:         General: No swelling.      Cervical back: Neck supple.   Skin:     General: Skin is warm and dry.      Capillary Refill: Capillary refill takes less than 2 seconds.   Neurological:      General: No focal deficit present.      Mental Status: She is alert and oriented to person, place, and time.   Psychiatric:         Mood and Affect: Mood normal.         Results Reviewed       Procedure Component Value Units Date/Time    UA (URINE) with reflex to Scope [[id number]]  (Abnormal) Collected: 12/17/24 2059    Lab Status: Final result Specimen: Urine, Clean Catch Updated: 12/17/24 2111     Color, UA Yellow     Clarity, UA Clear     Specific Gravity, UA 1.010     pH, UA 8.0     Leukocytes, UA Negative     Nitrite, UA Negative     Protein, UA Negative mg/dl      Glucose, UA Negative mg/dl      Ketones, UA Negative mg/dl      Urobilinogen, UA 0.2 E.U./dl      Bilirubin, UA Negative     Occult Blood, UA Negative    FLU/COVID Rapid Antigen (30 min. TAT) - Preferred screening test in ED [[id number]]  (Normal) Collected: 12/17/24 1900    Lab Status: Final result Specimen: Nares from Nose Updated: 12/17/24 1925     SARS COV Rapid Antigen Negative     Influenza A Rapid Antigen Negative     Influenza B Rapid Antigen Negative    Narrative:      This test has been performed using the Quidel Carlee 2 FLU+SARS Antigen test under the Emergency Use Authorization (EUA). This test has been validated by the  and verified by the performing laboratory. The Carlee uses lateral flow immunofluorescent sandwich assay to detect SARS-COV, Influenza A and Influenza B Antigen.     The Quidel Carlee 2 SARS Antigen test does not differentiate between SARS-CoV and SARS-CoV-2.     Negative results are presumptive and may be confirmed with a molecular assay, if necessary, for patient management. Negative results do not rule out SARS-CoV-2 or influenza infection and should not be used as the sole basis for treatment or  patient management decisions. A negative test result may occur if the level of antigen in a sample is below the limit of detection of this test.     Positive results are indicative of the presence of viral antigens, but do not rule out bacterial infection or co-infection with other viruses.     All test results should be used as an adjunct to clinical observations and other information available to the provider.    FOR PEDIATRIC PATIENTS - copy/paste COVID Guidelines URL to browser: [email or web address]    Comprehensive metabolic panel [[id number]]  (Abnormal) Collected: 12/17/24 1900    Lab Status: Final result Specimen: Blood from Arm, Right Updated: 12/17/24 1922     Sodium 142 mmol/L      Potassium 3.8 mmol/L      Chloride 107 mmol/L      CO2 28 mmol/L      ANION GAP 7 mmol/L      BUN 9 mg/dL      Creatinine 0.62 mg/dL      Glucose 111 mg/dL      Calcium 8.9 mg/dL      AST 12 U/L      ALT 16 U/L      Alkaline Phosphatase 49 U/L      Total Protein 6.8 g/dL      Albumin 3.9 g/dL      Total Bilirubin 0.38 mg/dL      eGFR 103 ml/min/1.73sq m     Narrative:      National Kidney Disease Foundation guidelines for Chronic Kidney Disease (CKD):     Stage 1 with normal or high GFR (GFR > 90 mL/min/1.73 square meters)    Stage 2 Mild CKD (GFR = 60-89 mL/min/1.73 square meters)    Stage 3A Moderate CKD (GFR = 45-59 mL/min/1.73 square meters)    Stage 3B Moderate CKD (GFR = 30-44 mL/min/1.73 square meters)    Stage 4 Severe CKD (GFR = 15-29 mL/min/1.73 square meters)    Stage 5 End Stage CKD (GFR <15 mL/min/1.73 square meters)  Note: GFR calculation is accurate only with a steady state creatinine    CBC and differential [[id number]] Collected: 12/17/24 1900    Lab Status: Final result Specimen: Blood from Arm, Right Updated: 12/17/24 1907     WBC 9.33 Thousand/uL      RBC 3.95 Million/uL      Hemoglobin 12.8 g/dL      Hematocrit 38.5 %      MCV 98 fL      MCH 32.4 pg       MCHC 33.2 g/dL      RDW 12.3 %      MPV 11.8 fL      Platelets 277 Thousands/uL      nRBC 0 /100 WBCs      Segmented % 54 %      Immature Grans % 0 %      Lymphocytes % 33 %      Monocytes % 10 %      Eosinophils Relative 3 %      Basophils Relative 0 %      Absolute Neutrophils 5.04 Thousands/µL      Absolute Immature Grans 0.02 Thousand/uL      Absolute Lymphocytes 3.05 Thousands/µL      Absolute Monocytes 0.95 Thousand/µL      Eosinophils Absolute 0.23 Thousand/µL      Basophils Absolute 0.04 Thousands/µL             CT chest abdomen pelvis w contrast   ED Interpretation by Al Mcbride DO (12/17 2245)   No significant findings in the abdomen or pelvis.  Mild lingular atelectasis and left lower lobe atelectasis noted.      Final Interpretation by Marge Flores MD (12/17 2050)      No acute findings in the chest, abdomen or pelvis. Mild lingular and left lower lobe atelectasis.               Workstation performed: [id number]             Procedures    ED Medication and Procedure Management   Prior to Admission Medications   Prescriptions Last Dose Informant Patient Reported? Taking?   albuterol (ProAir HFA) 90 mcg/act inhaler   No No   Sig: Inhale 2 puffs every 6 (six) hours as needed for wheezing   atorvastatin (LIPITOR) 40 mg tablet   Yes No   Sig: TAKE 1 TABLET BY MOUTH EVERY DAY AT NIGHT   gabapentin (NEURONTIN) 300 mg capsule   Yes No   Sig: Take 300 mg by mouth   naproxen (NAPROSYN) 500 mg tablet   Yes No   Sig: Take 500 mg by mouth   nicotine (NICODERM CQ) 21 mg/24 hr TD 24 hr patch   Yes No   Sig: Place 1 patch on the skin daily   predniSONE 10 mg tablet   No No   Sig: Take 40 mg (4 tablets) for 3 days, take 30 mg (3 tablets) for 3 days, take 20 mg (2 tablets) for 3 days.   rOPINIRole (REQUIP) 0.25 mg tablet   Yes No   Sig: PLEASE SEE ATTACHED FOR DETAILED DIRECTIONS      Facility-Administered Medications: None     Discharge Medication List as of 12/17/2024  9:45 PM        START taking these  medications    Details   albuterol (2.5 mg/3 mL) 0.083 % nebulizer solution Take 3 mL (2.5 mg total) by nebulization every 6 (six) hours as needed for wheezing or shortness of breath, Starting Tue 12/17/2024, Normal      fluticasone-umeclidinium-vilanterol (Trelegy Ellipta) 100-62.5-25 mcg/actuation inhaler Inhale 1 puff daily Rinse mouth after use., Starting Tue 12/17/2024, Until Thu 1/16/2025, Normal      LORazepam (Ativan) 0.5 mg tablet Take 1 tablet (0.5 mg total) by mouth daily at bedtime as needed for anxiety for up to 10 days, Starting Tue 12/17/2024, Until Fri 12/27/2024 at 2359, Normal      !! predniSONE 10 mg tablet 4 pills for 2 days, then 3 pills for 2 days, then 2 pills for 2 days then 1 pill for 2 days., Normal      promethazine-dextromethorphan (PHENERGAN-DM) 6.25-15 mg/5 mL oral syrup Take 5 mL by mouth 4 (four) times a day as needed for cough, Starting Tue 12/17/2024, Normal       !! - Potential duplicate medications found. Please discuss with provider.        CONTINUE these medications which have NOT CHANGED    Details   albuterol (ProAir HFA) 90 mcg/act inhaler Inhale 2 puffs every 6 (six) hours as needed for wheezing, Starting Fri 11/29/2024, Normal      atorvastatin (LIPITOR) 40 mg tablet TAKE 1 TABLET BY MOUTH EVERY DAY AT NIGHT, Historical Med      gabapentin (NEURONTIN) 300 mg capsule Take 300 mg by mouth, Historical Med      naproxen (NAPROSYN) 500 mg tablet Take 500 mg by mouth, Starting Thu 4/20/2023, Until Fri 4/19/2024 at 2359, Historical Med      nicotine (NICODERM CQ) 21 mg/24 hr TD 24 hr patch Place 1 patch on the skin daily, Starting Mon 3/27/2023, Historical Med      !! predniSONE 10 mg tablet Take 40 mg (4 tablets) for 3 days, take 30 mg (3 tablets) for 3 days, take 20 mg (2 tablets) for 3 days., Normal      rOPINIRole (REQUIP) 0.25 mg tablet PLEASE SEE ATTACHED FOR DETAILED DIRECTIONS, Historical Med       !! - Potential duplicate medications found. Please discuss with provider.         Outpatient Discharge Orders   Nebulizer Supplies     ED SEPSIS DOCUMENTATION   Time reflects when diagnosis was documented in both MDM as applicable and the Disposition within this note       Time User Action Codes Description Comment    12/17/2024  9:25 PM Al Mcbride [J44.1] COPD with exacerbation (HCC)     12/17/2024  9:32 PM Al Mcbride [F41.9,  F32.A] Anxiety and depression                  Al Mcbride,   12/17/24 2242

## 2024-12-18 LAB
ATRIAL RATE: 97 BPM
P AXIS: 58 DEGREES
PR INTERVAL: 150 MS
QRS AXIS: 81 DEGREES
QRSD INTERVAL: 66 MS
QT INTERVAL: 342 MS
QTC INTERVAL: 434 MS
T WAVE AXIS: 66 DEGREES
VENTRICULAR RATE: 97 BPM

## 2024-12-18 PROCEDURE — 93010 ELECTROCARDIOGRAM REPORT: CPT | Performed by: INTERNAL MEDICINE

## 2024-12-18 NOTE — DISCHARGE INSTRUCTIONS
Work on quitting smoking.  Take prednisone as directed.  Promethazine DM for cough  Begin trelogy daily  Take nebs 4 x daily  Get mucinex 2 x day